# Patient Record
(demographics unavailable — no encounter records)

---

## 2025-03-25 NOTE — DISCUSSION/SUMMARY
[FreeTextEntry1] : CAD s/p CABG: Feeling well. Cont asa, statin. Last EST normal.   HTN: Well controlled, cont meds, Crt normal  HLD: Lipids great, cont statin  AAo: 4.2. BP control, serial TTE/CT   RV 6M   [EKG obtained to assist in diagnosis and management of assessed problem(s)] : EKG obtained to assist in diagnosis and management of assessed problem(s)

## 2025-03-25 NOTE — HISTORY OF PRESENT ILLNESS
[FreeTextEntry1] : 64M with CAD s/p 4V CABG (5/4/18), HTN, HLD who presents for cardiac follow up  Feeling well without complaints Denies CP, denies dyspnea Remains active- golfs, very active with job  Can climb up and down stairs without difficulty- does multiple flights of stairs as part of his job  Episodes of RUQ pain, pending appointment with GI Dr. Adrian this week   HISTORY:  In 2018, had been having some CP, underwent EST which led to NST which led to Cardiac cath and SFH and eventual 4V CABG  Tolerated TURP in December 2022  Nonsmoker. Works as an . Lives with wife. Father, mother had CAD s/p CABG  ECG: SR, R in V1, borderline LAD  TTE: 7/2024:  1. Left ventricular cavity is normal in size. Left ventricular wall thickness is normal. Left ventricular systolic function is normal with an ejection fraction of 70 % by Harris's method of disks. There are no regional wall motion abnormalities seen. 2. Normal left ventricular diastolic function, with normal left ventricular filling pressure. 3. Normal right ventricular cavity size, with normal wall thickness, and normal right ventricular systolic function. Tricuspid annular plane systolic excursion (TAPSE) is 1.9 cm (normal >=1.7 cm). 4. Normal left and right atrial size. 5. Fibrocalcific aortic valve sclerosis without stenosis. 6. Ascending aorta diameter is dilated, measuring 4.20 cm (indexed 1.95 cm/m). 7. Compared to the transthoracic echocardiogram performed on 12/5/2022, there have been no significant interval changes.  EST: 10:00 mins, no ischemia   Asa 81mg Atorvastatin 40mg  Lisinopril 5mg Toprol 100mg